# Patient Record
Sex: FEMALE | URBAN - METROPOLITAN AREA
[De-identification: names, ages, dates, MRNs, and addresses within clinical notes are randomized per-mention and may not be internally consistent; named-entity substitution may affect disease eponyms.]

---

## 2024-11-07 ENCOUNTER — OFFICE VISIT (OUTPATIENT)
Dept: URBAN - METROPOLITAN AREA CLINIC 82 | Facility: CLINIC | Age: 19
End: 2024-11-07
Payer: COMMERCIAL

## 2024-11-07 ENCOUNTER — P2P PATIENT RECORD (OUTPATIENT)
Age: 19
End: 2024-11-07

## 2024-11-07 ENCOUNTER — TELEPHONE ENCOUNTER (OUTPATIENT)
Dept: URBAN - METROPOLITAN AREA CLINIC 82 | Facility: CLINIC | Age: 19
End: 2024-11-07

## 2024-11-07 VITALS
SYSTOLIC BLOOD PRESSURE: 131 MMHG | HEIGHT: 68 IN | TEMPERATURE: 98.3 F | DIASTOLIC BLOOD PRESSURE: 82 MMHG | BODY MASS INDEX: 26.98 KG/M2 | WEIGHT: 178 LBS | HEART RATE: 80 BPM

## 2024-11-07 DIAGNOSIS — K50.80 CROHN'S DISEASE OF BOTH SMALL AND LARGE INTESTINE WITHOUT COMPLICATION: ICD-10-CM

## 2024-11-07 PROCEDURE — 99214 OFFICE O/P EST MOD 30 MIN: CPT | Performed by: INTERNAL MEDICINE

## 2024-11-07 RX ORDER — FOLIC ACID 1 MG/1
1 TABLET TABLET ORAL ONCE A DAY
Qty: 90 TABLET | Refills: 3

## 2024-11-07 RX ORDER — ADALIMUMAB 40MG/0.4ML
AS DIRECTED KIT SUBCUTANEOUS
Qty: 12 KIT | Refills: 5 | Status: ACTIVE | COMMUNITY
Start: 2024-01-18 | End: 2025-11-28

## 2024-11-07 RX ORDER — FOLIC ACID 1 MG/1
1 TABLET TABLET ORAL ONCE A DAY
Qty: 90 TABLET | Refills: 3 | Status: ACTIVE | COMMUNITY
Start: 2024-01-30 | End: 2025-06-01

## 2024-11-07 RX ORDER — ADALIMUMAB 40MG/0.4ML
AS DIRECTED KIT SUBCUTANEOUS
Qty: 12 KIT | Refills: 5

## 2024-11-07 RX ORDER — METHOTREXATE SODIUM 2.5 MG/1
TAKE 5 TABLETS(12.5 MG) ONCE A WEEK TABLET ORAL
Qty: 60 TABLET | Refills: 3

## 2024-11-07 RX ORDER — METHOTREXATE SODIUM 2.5 MG/1
TAKE 5 TABLETS(12.5 MG) ONCE A WEEK TABLET ORAL
Qty: 60 TABLET | Refills: 3 | Status: ACTIVE | COMMUNITY
Start: 2024-01-30

## 2024-11-07 RX ORDER — ADALIMUMAB 40MG/0.4ML
AS DIRECTED KIT SUBCUTANEOUS
Qty: 12 KIT | Refills: 5
End: 2026-05-01

## 2024-11-27 LAB
A/G RATIO: 1.6
ABSOLUTE BASOPHILS: 48
ABSOLUTE EOSINOPHILS: 177
ABSOLUTE LYMPHOCYTES: 2013
ABSOLUTE MONOCYTES: 381
ABSOLUTE NEUTROPHILS: 4182
ALBUMIN: 4.4
ALKALINE PHOSPHATASE: 84
ALT (SGPT): 31
AST (SGOT): 24
BASOPHILS: 0.7
BILIRUBIN, TOTAL: 0.4
BUN/CREATININE RATIO: (no result)
BUN: 13
C-REACTIVE PROTEIN, QUANT: <3
CALCIUM: 9.4
CARBON DIOXIDE, TOTAL: 27
CHLORIDE: 103
CREATININE: 0.65
EGFR: 137
EOSINOPHILS: 2.6
FERRITIN, SERUM: 4
GLOBULIN, TOTAL: 2.7
GLUCOSE: 83
HEMATOCRIT: 45.9
HEMOGLOBIN: 14.4
HEPATITIS B CORE AB TOTAL: (no result)
HEPATITIS B SURFACE ANTIBODY QL: REACTIVE
HEPATITIS B SURFACE ANTIGEN: (no result)
LYMPHOCYTES: 29.6
MCH: 25.9
MCHC: 31.4
MCV: 82.6
MITOGEN-NIL: >10
MONOCYTES: 5.6
MPV: 10.8
NEUTROPHILS: 61.5
PLATELET COUNT: 327
POTASSIUM: 4.4
PROTEIN, TOTAL: 7.1
QUANTIFERON NIL VALUE: 0.07
QUANTIFERON TB1 AG VALUE: 0.02
QUANTIFERON TB2 AG VALUE: 0.01
QUANTIFERON-TB GOLD PLUS: NEGATIVE
RDW: 14.4
RED BLOOD CELL COUNT: 5.56
SODIUM: 140
VITAMIN B12: 289
WHITE BLOOD CELL COUNT: 6.8

## 2025-04-16 ENCOUNTER — TELEPHONE ENCOUNTER (OUTPATIENT)
Dept: URBAN - METROPOLITAN AREA CLINIC 85 | Facility: CLINIC | Age: 20
End: 2025-04-16

## 2025-05-22 ENCOUNTER — OFFICE VISIT (OUTPATIENT)
Dept: URBAN - METROPOLITAN AREA CLINIC 82 | Facility: CLINIC | Age: 20
End: 2025-05-22

## 2025-06-24 ENCOUNTER — OFFICE VISIT (OUTPATIENT)
Dept: URBAN - METROPOLITAN AREA CLINIC 82 | Facility: CLINIC | Age: 20
End: 2025-06-24

## 2025-06-24 ENCOUNTER — P2P PATIENT RECORD (OUTPATIENT)
Age: 20
End: 2025-06-24

## 2025-06-24 RX ORDER — FOLIC ACID 1 MG/1
1 TABLET TABLET ORAL ONCE A DAY
Qty: 90 TABLET | Refills: 3

## 2025-06-24 RX ORDER — ADALIMUMAB 40MG/0.4ML
AS DIRECTED KIT SUBCUTANEOUS
Qty: 12 KIT | Refills: 5 | Status: ACTIVE | COMMUNITY
End: 2026-05-01

## 2025-06-24 RX ORDER — ADALIMUMAB 40MG/0.4ML
AS DIRECTED KIT SUBCUTANEOUS
Qty: 12 KIT | Refills: 5
Start: 2025-06-24 | End: 2026-12-16

## 2025-06-24 RX ORDER — FOLIC ACID 1 MG/1
1 TABLET TABLET ORAL ONCE A DAY
Qty: 90 TABLET | Refills: 3 | Status: ACTIVE | COMMUNITY

## 2025-06-24 RX ORDER — METHOTREXATE SODIUM 2.5 MG/1
TAKE 5 TABLETS(12.5 MG) ONCE A WEEK TABLET ORAL
Qty: 60 TABLET | Refills: 3 | Status: ACTIVE | COMMUNITY

## 2025-06-24 RX ORDER — METHOTREXATE SODIUM 2.5 MG/1
TAKE 5 TABLETS(12.5 MG) ONCE A WEEK TABLET ORAL
Qty: 60 TABLET | Refills: 3

## 2025-07-15 ENCOUNTER — TELEPHONE ENCOUNTER (OUTPATIENT)
Dept: URBAN - METROPOLITAN AREA CLINIC 115 | Facility: CLINIC | Age: 20
End: 2025-07-15

## 2025-07-18 ENCOUNTER — TELEPHONE ENCOUNTER (OUTPATIENT)
Dept: URBAN - METROPOLITAN AREA CLINIC 82 | Facility: CLINIC | Age: 20
End: 2025-07-18